# Patient Record
Sex: MALE | Race: BLACK OR AFRICAN AMERICAN
[De-identification: names, ages, dates, MRNs, and addresses within clinical notes are randomized per-mention and may not be internally consistent; named-entity substitution may affect disease eponyms.]

---

## 2019-07-11 ENCOUNTER — HOSPITAL ENCOUNTER (EMERGENCY)
Dept: HOSPITAL 56 - MW.ED | Age: 39
LOS: 1 days | Discharge: HOME | End: 2019-07-12
Payer: COMMERCIAL

## 2019-07-11 DIAGNOSIS — Z79.899: ICD-10-CM

## 2019-07-11 DIAGNOSIS — I10: Primary | ICD-10-CM

## 2019-07-11 DIAGNOSIS — E03.9: ICD-10-CM

## 2019-07-11 NOTE — EDM.PDOC
<Demetrius Ch - Last Filed: 07/12/19 01:53>





ED HPI GENERAL MEDICAL PROBLEM





- General


Chief Complaint: General


Stated Complaint: HBP


Time Seen by Provider: 07/11/19 22:44





- History of Present Illness


INITIAL COMMENTS - FREE TEXT/NARRATIVE: 





HISTORY AND PHYSICAL:





History of present illness:


39-year-old male presents to the emergency department with his wife for the 

evaluation of hypertension and headache. He informed me that he has been 

diagnosed with hypertension for approximately 15 years he is currently 

prescribed Norvasc and Hyzaar. He checks his blood pressure daily and he has 

noticed over the last 10 days of his blood pressures been in the 140s to 150 

range which is unusual for him as he is usually right around 120 systolically. 

He also reports that in the past 8 days that he has had 2-3 headaches which are 

relieved with the naproxen. He further states in the past 24 hours he has had a 

persistent headache which is not responding as well as to the naproxen as 

previously. Currently rates his headache a 3 out of 10 which is a pressure-like 

feeling to his temporal areas. He denies any visual disturbances, nausea or 

vomiting. Lastly the patient informed me that over the last 5 days he has 

experienced heart palpitations the heart palpitations occur approximately 2-3 

times per day only lasting 2-3 seconds. He denies any feeling of 

lightheadedness or chest pain or chest pressure during the palpitations. The 

patient denies any recent fevers or chills.





Review of systems: 


As per history of present illness and below otherwise all systems reviewed and 

negative.





Past medical history: 


As per history of present illness and as reviewed below otherwise 

noncontributory.





Surgical history: 


As per history of present illness and as reviewed below otherwise 

noncontributory.





Social history: 


No reported history of drug or alcohol abuse.





Family history: 


As per history of present illness and as reviewed below otherwise 

noncontributory.





Physical exam:


General: Well-developed well-nourished nontoxic appearing in no apparent 

distress.


HEENT: Atraumatic, normocephalic, pupils reactive, negative for conjunctival 

pallor or scleral icterus, mucous membranes moist, throat clear, neck supple, 

nontender, trachea midline.


Lungs: Clear to auscultation, breath sounds equal bilaterally, chest nontender.


Heart: S1S2, regular, negative for clicks, rubs, or JVD.


Abdomen: Soft, nondistended, nontender. Negative for masses or 

hepatosplenomegaly. Negative for costovertebral tenderness.


Pelvis: Stable nontender.


Genitourinary: Deferred.


Rectal: Deferred.


Extremities: Atraumatic, negative for cords or calf pain. Neurovascular 

unremarkable.


Neuro: Awake, alert, oriented. Cranial nerves II through XII unremarkable. 

Cerebellum unremarkable. Motor and sensory unremarkable throughout. Exam 

nonfocal.





Diagnostics:


CBC, UA, EKG





Therapeutics:








Impression: 


Hypertension





Plan:


Arrival to the emergency department the patient's systolic blood pressure has 

been in the 140s to 160s. EKG was negative. He is not having any active chest 

pain. Laboratory results have been unremarkable. Follow-up with his primary 

care provider for adjustments of his home hypertension medications. Patient is 

agreement to the above plan is questions were answered.





Definitive disposition and diagnosis as appropriate pending reevaluation and 

review of above.








- Related Data


 Allergies











Allergy/AdvReac Type Severity Reaction Status Date / Time


 


No Known Allergies Allergy   Verified 07/11/19 22:52











Home Meds: 


 Home Meds





Levothyroxine 75 mcg PO ACBREAKFAST 07/11/19 [History]


Lisinopril/Hydrochlorothiazide [Lisinopril-HCTZ 10-12.5 MG] 1 tab PO DAILY 07/11 /19 [History]


amLODIPine [Norvasc] 2.5 mg PO DAILY 07/11/19 [History]











Past Medical History


Cardiovascular History: Reports: High Cholesterol, Hypertension


Endocrine/Metabolic History: Reports: Hypothyroidism





- Infectious Disease History


Infectious Disease History: Reports: None





Social & Family History





- Family History


Family Medical History: Noncontributory





- Tobacco Use


Smoking Status *Q: Never Smoker


Second Hand Smoke Exposure: No





- Caffeine Use


Caffeine Use: Reports: Coffee





Course





- Vital Signs


Last Recorded V/S: 


 Last Vital Signs











Temp  36.3 C   07/11/19 22:53


 


Pulse  80   07/11/19 22:53


 


Resp  16   07/11/19 22:53


 


BP  160/85 H  07/11/19 22:53


 


Pulse Ox  100   07/11/19 22:53














- Orders/Labs/Meds


Labs: 


 Laboratory Tests











  07/12/19 07/12/19 Range/Units





  00:10 01:00 


 


Sodium  140   (136-148)  mmol/L


 


Potassium  3.5   (3.5-5.1)  mmol/L


 


Chloride  104   ()  mmol/L


 


Carbon Dioxide  26.4   (21.0-32.0)  mmol/L


 


BUN  16   (7.0-18.0)  mg/dL


 


Creatinine  1.1   (0.8-1.3)  mg/dL


 


Est Cr Clr Drug Dosing  99.94   mL/min


 


Estimated GFR (MDRD)  > 60.0   ml/min


 


Glucose  94   ()  mg/dL


 


Calcium  8.5   (8.5-10.1)  mg/dL


 


Total Bilirubin  0.3   (0.2-1.0)  mg/dL


 


AST  12 L   (15-37)  IU/L


 


ALT  21   (14-63)  IU/L


 


Alkaline Phosphatase  43 L   ()  U/L


 


Total Protein  6.9   (6.4-8.2)  g/dL


 


Albumin  3.8   (3.4-5.0)  g/dL


 


Globulin  3.1   (2.6-4.0)  g/dL


 


Albumin/Globulin Ratio  1.2   (0.9-1.6)  


 


Urine Color   YELLOW  


 


Urine Appearance   CLEAR  


 


Urine pH   5.5  (5.0-8.0)  


 


Ur Specific Gravity   <= 1.005  (1.001-1.035)  


 


Urine Protein   NEGATIVE  (NEGATIVE)  mg/dL


 


Urine Glucose (UA)   NEGATIVE  (NEGATIVE)  mg/dL


 


Urine Ketones   NEGATIVE  (NEGATIVE)  mg/dL


 


Urine Occult Blood   NEGATIVE  (NEGATIVE)  


 


Urine Nitrite   NEGATIVE  (NEGATIVE)  


 


Urine Bilirubin   NEGATIVE  (NEGATIVE)  


 


Urine Urobilinogen   0.2  (<2.0)  EU/dL


 


Ur Leukocyte Esterase   NEGATIVE  (NEGATIVE)  














Departure





- Departure


Disposition: Home, Self-Care 01


Clinical Impression: 


Hypertension


Qualifiers:


 Hypertension type: unspecified Qualified Code(s): I10 - Essential (primary) 

hypertension








- Discharge Information


Referrals: 


Colby Gipson MD [Primary Care Provider] - 


Forms:  ED Department Discharge


Additional Instructions: 


The following information is given to patients seen in the emergency department 

who are being discharged to home. This information is to outline your options 

for follow-up care. We provide all patients seen in our emergency department 

with a follow-up referral.





The need for follow-up, as well as the timing and circumstances, are variable 

depending upon the specifics of your emergency department visit.





If you don't have a primary care physician on staff, we will provide you with a 

referral. We always advise you to contact your personal physician following an 

emergency department visit to inform them of the circumstance of the visit and 

for follow-up with them and/or the need for any referrals to a consulting 

specialist.





The emergency department will also refer you to a specialist when appropriate. 

This referral assures that you have the opportunity for followup care with a 

specialist. All of these measure are taken in an effort to provide you with 

optimal care, which includes your followup.





Under all circumstances we always encourage you to contact your private 

physician who remains a resource for coordinating  your care. When calling for 

followup care, please make the office aware that this follow-up is from your 

recent emergency room visit. If for any reason you are refused follow-up, 

please contact the [facility] emergency 

department at [phone] and ask to speak to the emergency department 

charge nurse.





[facility] 


Primary care- [place] [address]


[phone]





You with your home medications and call and schedule a follow-up appointment in 

the clinic for further care and evaluation of your blood pressure issues. 

Return to ER as needed and as discussed





<Kimberly Watson - Last Filed: 07/12/19 02:04>





ED HPI GENERAL MEDICAL PROBLEM





- History of Present Illness


INITIAL COMMENTS - FREE TEXT/NARRATIVE: 





This is Dr. Watson dictating an addendum note is in the supervising physician 

on this case. I agree with history and physical as above and I personally seen 

and evaluated the patient. The patient is aware that we will not be changing 

his blood pressure at this time and that he will need follow-up in his clinic 

to adjust his medications but we will check his renal function with a CMP and a 

UA and have also done an EKG. We've advised him on eating salty foods or foods 

high in sodium and to avoid those things. He is otherwise stable and is 

exhibiting no signs of end organ damage such as chest pain shortness of breath 

extremity swelling or urinary issues.





ED ROS GENERAL





- Review of Systems


Review Of Systems: ROS reveals no pertinent complaints other than HPI.





ED EXAM, GENERAL





- Physical Exam


Exam: See Below (See dictation)





Course





- Orders/Labs/Meds


Labs: 


 Laboratory Tests











  07/12/19 07/12/19 Range/Units





  00:10 01:00 


 


Sodium  140   (136-148)  mmol/L


 


Potassium  3.5   (3.5-5.1)  mmol/L


 


Chloride  104   ()  mmol/L


 


Carbon Dioxide  26.4   (21.0-32.0)  mmol/L


 


BUN  16   (7.0-18.0)  mg/dL


 


Creatinine  1.1   (0.8-1.3)  mg/dL


 


Est Cr Clr Drug Dosing  99.94   mL/min


 


Estimated GFR (MDRD)  > 60.0   ml/min


 


Glucose  94   ()  mg/dL


 


Calcium  8.5   (8.5-10.1)  mg/dL


 


Total Bilirubin  0.3   (0.2-1.0)  mg/dL


 


AST  12 L   (15-37)  IU/L


 


ALT  21   (14-63)  IU/L


 


Alkaline Phosphatase  43 L   ()  U/L


 


Total Protein  6.9   (6.4-8.2)  g/dL


 


Albumin  3.8   (3.4-5.0)  g/dL


 


Globulin  3.1   (2.6-4.0)  g/dL


 


Albumin/Globulin Ratio  1.2   (0.9-1.6)  


 


Urine Color   YELLOW  


 


Urine Appearance   CLEAR  


 


Urine pH   5.5  (5.0-8.0)  


 


Ur Specific Gravity   <= 1.005  (1.001-1.035)  


 


Urine Protein   NEGATIVE  (NEGATIVE)  mg/dL


 


Urine Glucose (UA)   NEGATIVE  (NEGATIVE)  mg/dL


 


Urine Ketones   NEGATIVE  (NEGATIVE)  mg/dL


 


Urine Occult Blood   NEGATIVE  (NEGATIVE)  


 


Urine Nitrite   NEGATIVE  (NEGATIVE)  


 


Urine Bilirubin   NEGATIVE  (NEGATIVE)  


 


Urine Urobilinogen   0.2  (<2.0)  EU/dL


 


Ur Leukocyte Esterase   NEGATIVE  (NEGATIVE)  














Departure





- Departure


Time of Disposition: 02:03


Condition: Good

## 2019-07-11 NOTE — EDM.PDOC
ED HPI GENERAL MEDICAL PROBLEM





- General


Stated Complaint: HBP


Time Seen by Provider: 07/11/19 22:44


Source of Information: Reports: Patient


History Limitations: Reports: No Limitations





- History of Present Illness


INITIAL COMMENTS - FREE TEXT/NARRATIVE: 


HISTORY AND PHYSICAL:





History of present illness:








Review of systems: 


As per history of present illness and below otherwise all systems reviewed and 

negative.





Past medical history: 


As per history of present illness and as reviewed below otherwise 

noncontributory.





Surgical history: 


As per history of present illness and as reviewed below otherwise 

noncontributory.





Social history: 


See social history for further information





Family history: 


As per history of present illness and as reviewed below otherwise 

noncontributory.





Physical exam:


General:


HEENT: Atraumatic, normocephalic, pupils equal and reactive bilaterally, 

negative for conjunctival pallor or scleral icterus, mucous membranes moist, 

TMs normal bilaterally, throat clear, neck supple, nontender, trachea midline. 

No drooling or trismus noted. No meningeal signs. No hot potato voice noted. 


Lungs: Clear to auscultation, breath sounds equal bilaterally, chest nontender.


Heart: S1S2, regular rate and rhythm without overt murmur


Abdomen: Soft, nondistended, nontender. Negative for masses or 

hepatosplenomegaly. Negative for costovertebral tenderness.


Pelvis: Stable nontender.


Genitourinary: Deferred.


Rectal: Deferred.


Skin: Intact, warm, dry. No lesions or rashes noted.


Extremities: Atraumatic, moves all extremities per self without difficulty or 

deficits, negative for cords or calf pain. Neurovascular unremarkable.


Neuro: Awake, alert, oriented. Cranial nerves II through XII unremarkable. 

Cerebellum unremarkable. Motor and sensory unremarkable throughout. Exam 

nonfocal.





Notes:





Supportive care measures were reviewed and discussed. Voices understanding and 

is agreeable to plan of care. Denies any further questions or concerns at this 

time.





Diagnostics:








Therapeutics:








Prescription:








Impression: 








Plan:








Definitive disposition and diagnosis as appropriate pending reevaluation and 

review of above.

## 2019-07-12 LAB
CHLORIDE SERPL-SCNC: 104 MMOL/L (ref 98–107)
SODIUM SERPL-SCNC: 140 MMOL/L (ref 136–148)